# Patient Record
Sex: FEMALE | Race: WHITE | Employment: OTHER | URBAN - NONMETROPOLITAN AREA
[De-identification: names, ages, dates, MRNs, and addresses within clinical notes are randomized per-mention and may not be internally consistent; named-entity substitution may affect disease eponyms.]

---

## 2019-01-17 LAB
BUN BLDV-MCNC: 18 MG/DL
CALCIUM SERPL-MCNC: 9.6 MG/DL
CHLORIDE BLD-SCNC: 101 MMOL/L
CO2: 24 MMOL/L
CREAT SERPL-MCNC: 0.9 MG/DL
GFR CALCULATED: 68.5
GLUCOSE BLD-MCNC: 119 MG/DL
POTASSIUM SERPL-SCNC: 4.6 MMOL/L
SODIUM BLD-SCNC: 141 MMOL/L

## 2022-01-18 LAB
ALBUMIN SERPL-MCNC: 4.7 G/DL
ALP BLD-CCNC: 65 U/L
ALT SERPL-CCNC: 25 U/L
ANION GAP SERPL CALCULATED.3IONS-SCNC: 12 MMOL/L
AST SERPL-CCNC: 21 U/L
AVERAGE GLUCOSE: NORMAL
BILIRUB SERPL-MCNC: 0.7 MG/DL (ref 0.1–1.4)
BUN BLDV-MCNC: 18 MG/DL
CALCIUM SERPL-MCNC: 9.7 MG/DL
CHLORIDE BLD-SCNC: 102 MMOL/L
CO2: 25 MMOL/L
CREAT SERPL-MCNC: 1.03 MG/DL
GFR CALCULATED: 54
GLUCOSE BLD-MCNC: 119 MG/DL
HBA1C MFR BLD: 6.3 %
POTASSIUM SERPL-SCNC: 4.7 MMOL/L
SODIUM BLD-SCNC: 139 MMOL/L
TOTAL PROTEIN: 7.6

## 2022-02-10 PROBLEM — R73.09 ABNORMAL GLUCOSE LEVEL: Status: ACTIVE | Noted: 2022-02-10

## 2022-02-10 PROBLEM — L25.9 CONTACT DERMATITIS: Status: ACTIVE | Noted: 2022-02-10

## 2022-02-10 PROBLEM — L01.00 IMPETIGO: Status: ACTIVE | Noted: 2022-02-10

## 2022-02-10 RX ORDER — LISINOPRIL AND HYDROCHLOROTHIAZIDE 20; 12.5 MG/1; MG/1
TABLET ORAL
COMMUNITY
Start: 2022-01-18

## 2022-02-10 RX ORDER — MELOXICAM 7.5 MG/1
TABLET ORAL
COMMUNITY
End: 2022-02-11

## 2022-02-10 RX ORDER — LISINOPRIL AND HYDROCHLOROTHIAZIDE 20; 12.5 MG/1; MG/1
TABLET ORAL
COMMUNITY
End: 2022-02-10

## 2022-02-10 RX ORDER — DEXAMETHASONE SODIUM PHOSPHATE 4 MG/ML
INJECTION, SOLUTION INTRA-ARTICULAR; INTRALESIONAL; INTRAMUSCULAR; INTRAVENOUS; SOFT TISSUE
COMMUNITY
End: 2022-02-10

## 2022-02-11 ENCOUNTER — OFFICE VISIT (OUTPATIENT)
Dept: NEPHROLOGY | Age: 67
End: 2022-02-11
Payer: MEDICARE

## 2022-02-11 VITALS
DIASTOLIC BLOOD PRESSURE: 89 MMHG | SYSTOLIC BLOOD PRESSURE: 150 MMHG | HEART RATE: 104 BPM | WEIGHT: 184 LBS | OXYGEN SATURATION: 97 % | TEMPERATURE: 95.2 F

## 2022-02-11 DIAGNOSIS — I12.9 HYPERTENSIVE RENAL DISEASE, STAGE 1 THROUGH STAGE 4 OR UNSPECIFIED CHRONIC KIDNEY DISEASE: ICD-10-CM

## 2022-02-11 DIAGNOSIS — N18.31 STAGE 3A CHRONIC KIDNEY DISEASE (HCC): Primary | ICD-10-CM

## 2022-02-11 PROCEDURE — 4040F PNEUMOC VAC/ADMIN/RCVD: CPT | Performed by: INTERNAL MEDICINE

## 2022-02-11 PROCEDURE — 3017F COLORECTAL CA SCREEN DOC REV: CPT | Performed by: INTERNAL MEDICINE

## 2022-02-11 PROCEDURE — G8427 DOCREV CUR MEDS BY ELIG CLIN: HCPCS | Performed by: INTERNAL MEDICINE

## 2022-02-11 PROCEDURE — G8484 FLU IMMUNIZE NO ADMIN: HCPCS | Performed by: INTERNAL MEDICINE

## 2022-02-11 PROCEDURE — 99204 OFFICE O/P NEW MOD 45 MIN: CPT | Performed by: INTERNAL MEDICINE

## 2022-02-11 PROCEDURE — 1090F PRES/ABSN URINE INCON ASSESS: CPT | Performed by: INTERNAL MEDICINE

## 2022-02-11 PROCEDURE — 1036F TOBACCO NON-USER: CPT | Performed by: INTERNAL MEDICINE

## 2022-02-11 PROCEDURE — 1123F ACP DISCUSS/DSCN MKR DOCD: CPT | Performed by: INTERNAL MEDICINE

## 2022-02-11 PROCEDURE — G8400 PT W/DXA NO RESULTS DOC: HCPCS | Performed by: INTERNAL MEDICINE

## 2022-02-11 PROCEDURE — G8421 BMI NOT CALCULATED: HCPCS | Performed by: INTERNAL MEDICINE

## 2022-02-11 NOTE — PROGRESS NOTES
[address] [address]  Dept: [phone]  Loc: [phone]  Outpatient Consult Note  2/11/2022 9:55 AM        Pt Name:    Arnulfo Akhtar  YOB: 1955  Primary Care Physician:  Samy White     Chief Complaint:   Chief Complaint   Patient presents with   [facility] Patient     Dr. Rene Ferreira - decreased renal function        Background Information/HPI   The patient is a 73 y.o. old white female with hx HTN for 6-7 years who is here for initial evaluation of elevated creatinine. Patient reports she has been on lisinopril-HCTZ for almost 6-7 years. Patient reports she was told she is \"pre-diabetic. \" No known heart problems. No lung problems. No hx smoking. No hx CVA. No hx cancer. No hx kidney stones. No gross hematuria. No leg swelling. No hx NSAID use. Retired from office work. No urinary complaints. She reports BP at home usually runs in 135/70 at home. She reports she did have some joint pain and took mobic but not very regularly and did not take much. Allergies:  Patient has no known allergies.         Past Medical History:  Past Medical History:   Diagnosis Date    Hyperglycemia     Hypertension         Past Surgical History:  None     Family History:  Family History   Problem Relation Age of Onset    Diabetes Mother     Hypertension Mother     Kidney Disease Mother         Social History:  Social History     Socioeconomic History    Marital status: Unknown     Spouse name: Not on file    Number of children: Not on file    Years of education: Not on file    Highest education level: Not on file   Occupational History    Not on file   Tobacco Use    Smoking status: Never Smoker    Smokeless tobacco: Never Used   Substance and Sexual Activity    Alcohol use: Not on file    Drug use: Not on file    Sexual activity: Not on file   Other Topics Concern    Not on file   Social History Narrative    Not on file     Social Determinants of Health     Financial Resource Strain:     Difficulty of Paying Living Expenses: Not on file   Food Insecurity:     Worried About Running Out of Food in the Last Year: Not on file    [place] of Food in the Last Year: Not on file   Transportation Needs:     Lack of Transportation (Medical): Not on file    Lack of Transportation (Non-Medical):  Not on file   Physical Activity:     Days of Exercise per Week: Not on file    Minutes of Exercise per Session: Not on file   Stress:     Feeling of Stress : Not on file   Social Connections:     Frequency of Communication with Friends and Family: Not on file    Frequency of Social Gatherings with Friends and Family: Not on file    Attends Sabianist Services: Not on file    Active Member of [address] [place] [address] or Organizations: Not on file    Attends Club or Organization Meetings: Not on file    Marital Status: Not on file   Intimate Partner Violence:     Fear of Current or Ex-Partner: Not on file    Emotionally Abused: Not on file    Physically Abused: Not on file    Sexually Abused: Not on file   Housing Stability:     Unable to Pay for Housing in the Last Year: Not on file    Number of [place] in the Last Year: Not on file    Unstable Housing in the Last Year: Not on file   +social alcohol use     Review of Systems:  Constitutional: no fever or chills  Head: No headaches  Eyes: no blurry vision, no discharge  Ears: no ear pain or hearing changes  Nose: no runny nose or epistaxis  Respiratory: no shortness of breath or cough or sputum production  Cardiovascular: no chest pain  GI: no nausea, no vomiting or diarrhea  : denies any discharge  Skin: no rash  Musculoskeletal: +some knee joint pain  Neuro: no numbness or tingling, no slurred speech  Psychiatric: stable mood, no depression or insomnia    All other review of systems were reviewed and negative     Home Medications:  Prior to Admission medications Medication Sig Start Date End Date Taking? Authorizing Provider   lisinopril-hydroCHLOROthiazide (PRINZIDE;ZESTORETIC) 20-12.5 MG per tablet  1/18/22   Historical Provider, MD        Physical Examination:  VITALS:  BP (!) 150/89 (Site: Left Upper Arm, Position: Sitting, Cuff Size: Medium Adult)   Pulse 104   Temp 95.2 °F (35.1 °C)   Wt 184 lb (83.5 kg)   SpO2 97%   There is no height or weight on file to calculate BMI. Wt Readings from Last 3 Encounters:   02/11/22 184 lb (83.5 kg)     Constitutional and General Appearance: alert and cooperative with exam, appears comfortable, no distress, not diaphoretic  Ears and Nose: normal external appearance of left and right ear  Neck: No jugular venous distention  Lungs: Air entry B/L, no crackles or rales, no use of accessory muscles or labored breathing  Heart: regular rate, S1, S2  Extremities: No LE edema, no tenderness  Skin: no rash seen on exposed extremities, warm to touch  Musculo: moves all extremities  Neuro: no slurred speech, no facial drooping  Psychiatric: Normal mood and affect, Not agitated  Back: No flank tenderness to palpation     Laboratory & Diagnostics:  Old progress notes from referring physician reviewed. Radiology/US kidneys:   Echo:   Old labs reviewed:  Jan 2022: K 4.7, creat 1.03, Albumin 4.7, AST 21, ALT 25  AIC 6.3%     Impression/Plan:   1. Borderline CKD IIIA: likely secondary to HTN nephrosclerosis. She reports she has had HTN for several years. Will check UA, urine protein-creatinine ratio and baseline kidney US to check echogenicity and kidney size. Discussed in detail. Advised low salt. 2. HTN: on lisinopril-HCTZ daily, advised low salt diet. She says she thinks she could make some dietary changes in terms of salt intake. She reports BP at home is usually in 130-140 range.      Orders Placed This Encounter   Procedures    US RENAL COMPLETE    Basic Metabolic Panel    Protein / creatinine ratio, urine    Vitamin D 25 Hydroxy  Urinalysis    CBC     Return in about 6 months (around 8/11/2022).       Thought process was discussed with the patient  Thank you for the referral  Please do not hesitate to contact me if you have any questions or concerns  I will make further recommendations depending on clinical course and lab/diagnostic results      Belem Finley MD  [facility]

## 2022-08-05 DIAGNOSIS — N18.31 STAGE 3A CHRONIC KIDNEY DISEASE (HCC): ICD-10-CM

## 2022-08-05 DIAGNOSIS — I12.9 HYPERTENSIVE RENAL DISEASE, STAGE 1 THROUGH STAGE 4 OR UNSPECIFIED CHRONIC KIDNEY DISEASE: ICD-10-CM

## 2022-08-12 ENCOUNTER — OFFICE VISIT (OUTPATIENT)
Dept: NEPHROLOGY | Age: 67
End: 2022-08-12
Payer: MEDICARE

## 2022-08-12 VITALS
WEIGHT: 186 LBS | TEMPERATURE: 97.8 F | SYSTOLIC BLOOD PRESSURE: 145 MMHG | HEART RATE: 103 BPM | DIASTOLIC BLOOD PRESSURE: 91 MMHG | OXYGEN SATURATION: 97 %

## 2022-08-12 DIAGNOSIS — N18.31 CHRONIC KIDNEY DISEASE, STAGE 3A (HCC): ICD-10-CM

## 2022-08-12 DIAGNOSIS — I12.9 HYPERTENSIVE RENAL DISEASE, STAGE 1 THROUGH STAGE 4 OR UNSPECIFIED CHRONIC KIDNEY DISEASE: Primary | ICD-10-CM

## 2022-08-12 PROCEDURE — 3017F COLORECTAL CA SCREEN DOC REV: CPT | Performed by: INTERNAL MEDICINE

## 2022-08-12 PROCEDURE — 1036F TOBACCO NON-USER: CPT | Performed by: INTERNAL MEDICINE

## 2022-08-12 PROCEDURE — G8421 BMI NOT CALCULATED: HCPCS | Performed by: INTERNAL MEDICINE

## 2022-08-12 PROCEDURE — 99213 OFFICE O/P EST LOW 20 MIN: CPT | Performed by: INTERNAL MEDICINE

## 2022-08-12 PROCEDURE — G8427 DOCREV CUR MEDS BY ELIG CLIN: HCPCS | Performed by: INTERNAL MEDICINE

## 2022-08-12 PROCEDURE — 1090F PRES/ABSN URINE INCON ASSESS: CPT | Performed by: INTERNAL MEDICINE

## 2022-08-12 PROCEDURE — G8400 PT W/DXA NO RESULTS DOC: HCPCS | Performed by: INTERNAL MEDICINE

## 2022-08-12 PROCEDURE — 1123F ACP DISCUSS/DSCN MKR DOCD: CPT | Performed by: INTERNAL MEDICINE

## 2022-08-12 NOTE — PROGRESS NOTES
[address] [facility]  [address] [address]  Dept: [phone]  Loc: [phone]  Office Progress Note  8/12/2022 9:44 AM      Pt Name:    Carlos Villar  YOB: 1955  Primary Care Physician:  Hector Caceres     Chief Complaint:   Chief Complaint   Patient presents with    Chronic Kidney Disease     Stage 3        Background Information/Interval History:   The patient is a 78 y.o. old white female with hx HTN for 6-7 years who is here for follow-up evaluation of elevated creatinine. Patient reports she has been on lisinopril-HCTZ for almost 6-7 years. She reports she did have some joint pain and took mobic but not very regularly and did not take much. Here for 6 months follow-up. Feels well. No complaints. No urinary complaints. No leg swelling. No chest pain or shortness of breath. Overall she feels well. BP is controlled with lisinopril-HCTZ: BP at home is usually in 130-140/80. She says her BP is never high at home. Today in office it is 145/91     Past History:  Past Medical History:   Diagnosis Date    Hyperglycemia     Hypertension      No past surgical history on file. VITALS:  BP (!) 145/91 (Site: Left Upper Arm, Position: Sitting, Cuff Size: Medium Adult)   Pulse (!) 103   Temp 97.8 °F (36.6 °C)   Wt 186 lb (84.4 kg)   SpO2 97%   Wt Readings from Last 3 Encounters:   08/12/22 186 lb (84.4 kg)   02/11/22 184 lb (83.5 kg)     There is no height or weight on file to calculate BMI.      General Appearance: alert and cooperative with exam, appears comfortable, no distress  Oral: moist oral mucus membranes  Neck: No jugular venous distention  Lungs: Air entry B/L, no crackles or rales, no use of accessory muscles  Heart: S1, S2 heard  GI: soft, non-tender, no guarding  Extremities: No sig LE edema     Medications:  Current Outpatient Medications   Medication Sig Dispense Refill    lisinopril-hydroCHLOROthiazide (PRINZIDE;ZESTORETIC) 20-12.5 MG per tablet        No current facility-administered medications for this visit. Laboratory & Diagnostics:  Old labs  Jan 2022: K 4.7, creat 1.03, Albumin 4.7, AST 21, ALT 25  AIC 6.3%    US: R 10.4, L 9.3  Aug 2022: Vit D 30, UPCR 200 mg/g, UA: 0-2 RBC  K 4.2, Creat 1.0     Impression/Plan:   1. HTN: stable at home, could be a bit better however, Discussed medicatons adjustment. She prefer to wait for now. She will try dietary and weight modification etc.Patient will keep us informed. Advised low salt diet. Again, she is not interested in increasing anti HTN medications at this time. 2. Vit D: Borderline take 1000 Vit D OTC  3. Impaired fasting glucose: following with PCP  4. Renal: creat is 1.0, per MDRD CKD IIIa    Orders Placed This Encounter   Procedures    Basic Metabolic Panel    Protein / creatinine ratio, urine     Return in about 1 year (around 8/12/2023).       Hanna Ibarra MD  [facility]

## 2023-08-11 ENCOUNTER — OFFICE VISIT (OUTPATIENT)
Dept: NEPHROLOGY | Age: 68
End: 2023-08-11
Payer: MEDICARE

## 2023-08-11 VITALS
WEIGHT: 178.4 LBS | SYSTOLIC BLOOD PRESSURE: 136 MMHG | DIASTOLIC BLOOD PRESSURE: 82 MMHG | HEART RATE: 77 BPM | OXYGEN SATURATION: 99 %

## 2023-08-11 DIAGNOSIS — I10 PRIMARY HYPERTENSION: Primary | ICD-10-CM

## 2023-08-11 PROCEDURE — G8427 DOCREV CUR MEDS BY ELIG CLIN: HCPCS | Performed by: INTERNAL MEDICINE

## 2023-08-11 PROCEDURE — G8421 BMI NOT CALCULATED: HCPCS | Performed by: INTERNAL MEDICINE

## 2023-08-11 PROCEDURE — 3017F COLORECTAL CA SCREEN DOC REV: CPT | Performed by: INTERNAL MEDICINE

## 2023-08-11 PROCEDURE — 99213 OFFICE O/P EST LOW 20 MIN: CPT | Performed by: INTERNAL MEDICINE

## 2023-08-11 PROCEDURE — 1036F TOBACCO NON-USER: CPT | Performed by: INTERNAL MEDICINE

## 2023-08-11 PROCEDURE — 1123F ACP DISCUSS/DSCN MKR DOCD: CPT | Performed by: INTERNAL MEDICINE

## 2023-08-11 PROCEDURE — 3079F DIAST BP 80-89 MM HG: CPT | Performed by: INTERNAL MEDICINE

## 2023-08-11 PROCEDURE — G8400 PT W/DXA NO RESULTS DOC: HCPCS | Performed by: INTERNAL MEDICINE

## 2023-08-11 PROCEDURE — 1090F PRES/ABSN URINE INCON ASSESS: CPT | Performed by: INTERNAL MEDICINE

## 2023-08-11 PROCEDURE — 3075F SYST BP GE 130 - 139MM HG: CPT | Performed by: INTERNAL MEDICINE

## 2023-08-11 RX ORDER — PIOGLITAZONEHYDROCHLORIDE 15 MG/1
15 TABLET ORAL DAILY
COMMUNITY

## 2023-08-11 NOTE — PROGRESS NOTES
[address]  [facility] [address]  Dept: [phone]  Loc: [phone]  Office Progress Note  8/11/2023 9:25 AM      Pt Name:    Danny Day  YOB: 1955  Primary Care Physician:  Dagmar Kim     Chief Complaint:   Chief Complaint   Patient presents with    Follow-up     1 year follow up for HTN        Background Information/Interval History:   The patient is a 80 y.o. old white female with hx HTN for 6-7 years who is here for follow-up evaluation of elevated creatinine. Patient reports she has been on lisinopril-HCTZ for almost 6-7 years. She reports she did have some joint pain and took mobic but not very regularly and did not take much. Here for 12 months follow-up. BP is controlled with lisinopril-HCTZ. Did not bring home log but says BP is usually in 120-130 range. She reports she was started on actos by PCP in Jan for borderline high sugars. Past History:  Past Medical History:   Diagnosis Date    Hyperglycemia     Hypertension      No past surgical history on file. VITALS:  /82 (Site: Right Upper Arm, Position: Sitting, Cuff Size: Large Adult)   Pulse 77   Wt 178 lb 6.4 oz (80.9 kg)   SpO2 99%   Wt Readings from Last 3 Encounters:   08/11/23 178 lb 6.4 oz (80.9 kg)   08/12/22 186 lb (84.4 kg)   02/11/22 184 lb (83.5 kg)     There is no height or weight on file to calculate BMI.      General Appearance: alert and cooperative with exam, appears comfortable, no distress  Oral: moist oral mucus membranes  Neck: No jugular venous distention  Lungs: Air entry B/L, no crackles or rales, no use of accessory muscles  Heart: S1, S2 heard  GI: soft, non-tender, no guarding  Extremities: No sig LE edema     Medications:  Current Outpatient Medications   Medication Sig Dispense Refill    pioglitazone (ACTOS) 15 MG tablet Take 1 tablet by mouth daily

## 2024-10-31 ENCOUNTER — TELEPHONE (OUTPATIENT)
Dept: NEPHROLOGY | Age: 69
End: 2024-10-31

## 2024-10-31 NOTE — TELEPHONE ENCOUNTER
----- Message from Dr. Danielito Costello MD sent at 10/31/2024  1:47 PM EDT -----  If any UTI symptoms then check urine culture.  ----- Message -----  From: Shirley Krause CMA  Sent: 10/31/2024   1:14 PM EDT  To: Danielito Costello MD

## 2024-11-07 PROBLEM — D12.6 ADENOMATOUS POLYP OF COLON: Status: ACTIVE | Noted: 2024-11-07

## 2024-11-07 RX ORDER — MULTIVITAMIN WITH IRON
1 TABLET ORAL DAILY
COMMUNITY

## 2024-11-07 RX ORDER — ROSUVASTATIN CALCIUM 20 MG/1
20 TABLET, COATED ORAL DAILY
COMMUNITY
Start: 2024-09-19

## 2024-11-08 ENCOUNTER — OFFICE VISIT (OUTPATIENT)
Dept: NEPHROLOGY | Age: 69
End: 2024-11-08

## 2024-11-08 VITALS
WEIGHT: 187 LBS | OXYGEN SATURATION: 97 % | HEART RATE: 99 BPM | BODY MASS INDEX: 28.34 KG/M2 | HEIGHT: 68 IN | SYSTOLIC BLOOD PRESSURE: 167 MMHG | DIASTOLIC BLOOD PRESSURE: 85 MMHG

## 2024-11-08 DIAGNOSIS — I10 PRIMARY HYPERTENSION: Primary | ICD-10-CM

## 2024-11-08 NOTE — PROGRESS NOTES
[facility]  [facility]  [address]  [facility] [address]  Dept: [phone]  Loc: [phone]  Office Progress Note  11/8/2024 9:11 AM      Pt Name:    Phoebe Vazquez  YOB: 1955  Primary Care Physician:  Ernie Platt MD     Chief Complaint:   Chief Complaint   Patient presents with    Follow-up     1 year follow up for HTN        Background Information/Interval History:   The patient is a 69 y.o. old white female with hx HTN for 6-7 years who is here for follow-up evaluation of elevated creatinine. Patient reports she has been on lisinopril-HCTZ for almost 6-7 years.  She reports she did have some joint pain and took mobic but not very regularly and did not take much.     She is here for usual follow-up.  Reports health issues in family. She is stressed out. BP at home is usually in 135-140 range over 75-80. No UTI symptoms.     Past History:  Past Medical History:   Diagnosis Date    Hyperglycemia     Hypertension      No past surgical history on file.     VITALS:  BP (!) 167/85 (Site: Right Upper Arm, Position: Sitting, Cuff Size: Medium Adult)   Pulse 99   Ht 1.727 m (5' 8\")   Wt 84.8 kg (187 lb)   SpO2 97%   BMI 28.43 kg/m²   Wt Readings from Last 3 Encounters:   11/08/24 84.8 kg (187 lb)   08/11/23 80.9 kg (178 lb 6.4 oz)   08/12/22 84.4 kg (186 lb)     Body mass index is 28.43 kg/m².     General Appearance: alert and cooperative with exam  Lungs: Air entry B/L, no crackles or rales, no use of accessory muscles  Heart: S1, S2 heard  GI: soft, non-tender, no guarding  Extremities: No sig LE edema     Medications:  Current Outpatient Medications   Medication Sig Dispense Refill    CHROMIUM PICOLINATE PO Take by mouth      vitamin D (CHOLECALCIFEROL) 25 MCG (1000 UT) TABS tablet Take 1 tablet by mouth daily      rosuvastatin (CRESTOR) 20 MG tablet Take 1 tablet by mouth daily      Multiple Vitamin (MULTIVITAMIN)